# Patient Record
Sex: MALE | Race: WHITE
[De-identification: names, ages, dates, MRNs, and addresses within clinical notes are randomized per-mention and may not be internally consistent; named-entity substitution may affect disease eponyms.]

---

## 2017-05-22 ENCOUNTER — HOSPITAL ENCOUNTER (OUTPATIENT)
Dept: HOSPITAL 76 - DI | Age: 62
Discharge: HOME | End: 2017-05-22
Attending: NURSE PRACTITIONER
Payer: COMMERCIAL

## 2017-05-22 DIAGNOSIS — H93.19: Primary | ICD-10-CM

## 2017-05-22 PROCEDURE — 93880 EXTRACRANIAL BILAT STUDY: CPT

## 2017-05-24 NOTE — ULTRASOUND REPORT
BILATERAL CAROTID ARTERY ULTRASOUND: 05/22/2017 



CLINICAL HISTORY: Pulse in his ears. 



Examination was done with a [employer] color Doppler scanning system. 



TECHNIQUE: Real-time sonographic vascular imaging was performed by the 
sonographer through the carotid arteries utilizing both color-flow and Doppler 
spectral analysis. Multiple representative static images were saved for review. 









Vessel PSV

cm/sec 2D Plaque

Estimate % ICA/CCA

PSV EDV

cm/sec % Stenosis 

 

RCCA Prox 104   -- 

 

RCCA Dist 80   22 

 

RECA 190   -- 

 

RT BULB 78 -- 0.98 24 

 

CRISTINE Prox 71 -- 0.89 23 

 

CRISTINE Mid 76 -- 0.95 24 

 

CRISTINE Dist 90 -- 1.13 30  

 

RVA  47    





RVA flow direction: Antegrade.







Vessel PSV

cm/sec 2D Plaque

Estimate % ICA/CCA

PSV EDV

cm/sec % Stenosis

 

LCCA Prox 106   -- 

 

LCCA Dist 79   24 

 

LECA 168   -- 

 

LFT BULB 50 -- 0.63 17 

 

LICA Prox 82 -- 1.04 32 

 

LICA Mid 75 -- 0.95 35 

 

LICA Dist 46 -- 0.58 17  

 

LVA 79    





LVA flow direction: Antegrade.



Velocity criteria are extrapolated from diameter data as defined by the Society 
of Radiologists in Ultrasound Consensus Conference Radiology 2003; 229; 340-
346. 









Degree of

Stenosis % ICA PSV

cm/sec Plaque

Estimate % ICA/CCA RSV

Ratio ICA EDV

cm/sec

 

Normal < 125 None < 2.0 < 40

 

<50 < 125 < 50 < 2.0 < 40

 

50-69 125 - 130 >/= 50 2.0 - 4.0 40 - 100

 

>/= 70 but

less than near

occlusion > 230 >/= 50 > 4.0 > 100

 

Near occlusion High, low, or

undetectable Visible lumen Variable Variable

 

Total occlusion Undetectable No detectable

lumen Not

applicable Not

applicable





FINDINGS: Mild to moderate plaque is noted at each carotid artery bifurcation 
with no hemodynamically significant stenosis in either common or internal 
carotid artery. Doppler values are slightly elevated in each external carotid 
artery. Visually, however, no significant stenosis was detected in the external 
carotid arteries. Vertebral arteries bilaterally show antegrade flow. 



IMPRESSION: MILD TO MODERATE PLAQUE IS DETECTED AT EACH CAROTID ARTERY 
BIFURCATION WITH NO HEMODYNAMICALLY SIGNIFICANT STENOSIS DETECTED. 
MTDD

## 2017-08-04 ENCOUNTER — HOSPITAL ENCOUNTER (OUTPATIENT)
Dept: HOSPITAL 76 - DI.S | Age: 62
Discharge: HOME | End: 2017-08-04
Attending: FAMILY MEDICINE
Payer: COMMERCIAL

## 2017-08-04 DIAGNOSIS — M71.21: Primary | ICD-10-CM

## 2017-08-04 NOTE — XRAY REPORT
THREE-VIEW RIGHT KNEE:  08/04/2017

 

CLINICAL INDICATION:  Baker's cyst.  

 

FINDINGS:  AP, lateral, sunrise views of the right knee demonstrate no evidence of fracture or disloc
ation.  A tiny osteophyte is noted on the superior patella.  No radiopaque foreign body is seen in th
e soft tissues.  No joint effusion is present.  

 

IMPRESSION:  TINY MARGINAL OSTEOPHYTE ON THE SUPERIOR PATELLA.  

 

 

 

DD:08/04/2017 15:58:24  DT: 08/04/2017 16:24  JOB #: [id number]  EXT JOB #:[id number]

## 2017-11-14 ENCOUNTER — HOSPITAL ENCOUNTER (OUTPATIENT)
Dept: HOSPITAL 76 - DI.S | Age: 62
Discharge: HOME | End: 2017-11-14
Attending: PHYSICIAN ASSISTANT
Payer: COMMERCIAL

## 2017-11-14 DIAGNOSIS — M51.34: ICD-10-CM

## 2017-11-14 DIAGNOSIS — M25.511: Primary | ICD-10-CM

## 2017-11-14 DIAGNOSIS — M41.54: ICD-10-CM

## 2017-11-14 PROCEDURE — 72070 X-RAY EXAM THORAC SPINE 2VWS: CPT

## 2017-11-14 NOTE — XRAY REPORT
TWO VIEW THORACIC SPINE:  11/14/2017 

 

CLINICAL INDICATION:  Pain. 

 

Frontal and lateral views of the thoracic spine demonstrate mild degenerative disk disease.  Minimal 
dextroscoliosis is present.  No paraspinal hematoma is seen. 

 

IMPRESSION:  MILD DEGENERATIVE DISK DISEASE, WITH MINIMAL DEGENERATIVE DEXTROSCOLIOSIS.  NO EVIDENCE 
OF FRACTURE. 

 

 

 

DD:11/14/2017 15:02:03  DT: 11/14/2017 19:33  JOB #: [id number]  EXT JOB #:[id number]

## 2017-11-14 NOTE — XRAY REPORT
THREE VIEW RIGHT SHOULDER:  11/14/2017 

 

CLINICAL INDICATION:  Pain. 

 

Internal and external rotational views and a scapular Y-view of the right shoulder demonstrate no bryson
dence of fracture or dislocation.  The joint spaces are preserved.  No radiopaque foreign body is see
n in the soft tissues. 

 

IMPRESSION:  NORMAL RIGHT SHOULDER. 

 

 

 

DD:11/14/2017 15:04:15  DT: 11/14/2017 19:37  JOB #: [id number]  EXT JOB #:[id number]

## 2018-02-02 ENCOUNTER — HOSPITAL ENCOUNTER (INPATIENT)
Dept: HOSPITAL 76 - ED | Age: 63
LOS: 1 days | Discharge: HOME | DRG: 65 | End: 2018-02-03
Attending: INTERNAL MEDICINE | Admitting: INTERNAL MEDICINE
Payer: COMMERCIAL

## 2018-02-02 DIAGNOSIS — R29.701: ICD-10-CM

## 2018-02-02 DIAGNOSIS — Q28.1: ICD-10-CM

## 2018-02-02 DIAGNOSIS — Z86.73: ICD-10-CM

## 2018-02-02 DIAGNOSIS — R47.1: ICD-10-CM

## 2018-02-02 DIAGNOSIS — I11.9: ICD-10-CM

## 2018-02-02 DIAGNOSIS — I65.1: ICD-10-CM

## 2018-02-02 DIAGNOSIS — R29.810: ICD-10-CM

## 2018-02-02 DIAGNOSIS — I63.542: Primary | ICD-10-CM

## 2018-02-02 DIAGNOSIS — E78.9: ICD-10-CM

## 2018-02-02 DIAGNOSIS — R40.2412: ICD-10-CM

## 2018-02-02 DIAGNOSIS — Z91.14: ICD-10-CM

## 2018-02-02 LAB
ALBUMIN DIAFP-MCNC: 4.8 G/DL (ref 3.2–5.5)
ALBUMIN/GLOB SERPL: 1.6 {RATIO} (ref 1–2.2)
ALP SERPL-CCNC: 65 IU/L (ref 42–121)
ALT SERPL W P-5'-P-CCNC: 23 IU/L (ref 10–60)
ANION GAP SERPL CALCULATED.4IONS-SCNC: 14 MMOL/L (ref 6–13)
AST SERPL W P-5'-P-CCNC: 26 IU/L (ref 10–42)
BASOPHILS NFR BLD AUTO: 0.1 10^3/UL (ref 0–0.1)
BASOPHILS NFR BLD AUTO: 1.1 %
BILIRUB BLD-MCNC: 0.7 MG/DL (ref 0.2–1)
BUN SERPL-MCNC: 16 MG/DL (ref 6–20)
CALCIUM UR-MCNC: 9.9 MG/DL (ref 8.5–10.3)
CHLORIDE SERPL-SCNC: 97 MMOL/L (ref 101–111)
CO2 SERPL-SCNC: 28 MMOL/L (ref 21–32)
CREAT SERPLBLD-SCNC: 0.9 MG/DL (ref 0.6–1.2)
EOSINOPHIL # BLD AUTO: 0.1 10^3/UL (ref 0–0.7)
EOSINOPHIL NFR BLD AUTO: 1.5 %
ERYTHROCYTE [DISTWIDTH] IN BLOOD BY AUTOMATED COUNT: 13.5 % (ref 12–15)
GFRSERPLBLD MDRD-ARVRAT: 86 ML/MIN/{1.73_M2} (ref 89–?)
GLOBULIN SER-MCNC: 3 G/DL (ref 2.1–4.2)
GLUCOSE SERPL-MCNC: 101 MG/DL (ref 70–100)
HGB UR QL STRIP: 15.8 G/DL (ref 14–18)
INR PPP: 1 (ref 0.8–1.2)
LIPASE SERPL-CCNC: 39 U/L (ref 22–51)
LYMPHOCYTES # SPEC AUTO: 2.1 10^3/UL (ref 1.5–3.5)
LYMPHOCYTES NFR BLD AUTO: 27 %
MCH RBC QN AUTO: 29.2 PG (ref 27–31)
MCHC RBC AUTO-ENTMCNC: 33.1 G/DL (ref 32–36)
MCV RBC AUTO: 88.3 FL (ref 80–94)
MONOCYTES # BLD AUTO: 0.8 10^3/UL (ref 0–1)
MONOCYTES NFR BLD AUTO: 10.3 %
NEUTROPHILS # BLD AUTO: 4.6 10^3/UL (ref 1.5–6.6)
NEUTROPHILS # SNV AUTO: 7.7 X10^3/UL (ref 4.8–10.8)
NEUTROPHILS NFR BLD AUTO: 60.1 %
PDW BLD AUTO: 8.8 FL (ref 7.4–11.4)
PLATELET # BLD: 342 10^3/UL (ref 130–450)
PROT SPEC-MCNC: 7.8 G/DL (ref 6.7–8.2)
PROTHROM ACT/NOR PPP: 11.3 SECS (ref 9.9–12.6)
RBC MAR: 5.4 10^6/UL (ref 4.7–6.1)
SODIUM SERPLBLD-SCNC: 139 MMOL/L (ref 135–145)

## 2018-02-02 PROCEDURE — 93880 EXTRACRANIAL BILAT STUDY: CPT

## 2018-02-02 PROCEDURE — 80053 COMPREHEN METABOLIC PANEL: CPT

## 2018-02-02 PROCEDURE — 85610 PROTHROMBIN TIME: CPT

## 2018-02-02 PROCEDURE — 70551 MRI BRAIN STEM W/O DYE: CPT

## 2018-02-02 PROCEDURE — 85025 COMPLETE CBC W/AUTO DIFF WBC: CPT

## 2018-02-02 PROCEDURE — 83690 ASSAY OF LIPASE: CPT

## 2018-02-02 PROCEDURE — 93306 TTE W/DOPPLER COMPLETE: CPT

## 2018-02-02 PROCEDURE — 99285 EMERGENCY DEPT VISIT HI MDM: CPT

## 2018-02-02 PROCEDURE — 80048 BASIC METABOLIC PNL TOTAL CA: CPT

## 2018-02-02 PROCEDURE — 93005 ELECTROCARDIOGRAM TRACING: CPT

## 2018-02-02 PROCEDURE — 86147 CARDIOLIPIN ANTIBODY EA IG: CPT

## 2018-02-02 PROCEDURE — 84484 ASSAY OF TROPONIN QUANT: CPT

## 2018-02-02 PROCEDURE — 36415 COLL VENOUS BLD VENIPUNCTURE: CPT

## 2018-02-02 PROCEDURE — 99284 EMERGENCY DEPT VISIT MOD MDM: CPT

## 2018-02-02 PROCEDURE — 80061 LIPID PANEL: CPT

## 2018-02-02 PROCEDURE — 70450 CT HEAD/BRAIN W/O DYE: CPT

## 2018-02-02 PROCEDURE — 83721 ASSAY OF BLOOD LIPOPROTEIN: CPT

## 2018-02-02 PROCEDURE — 70544 MR ANGIOGRAPHY HEAD W/O DYE: CPT

## 2018-02-02 PROCEDURE — 85730 THROMBOPLASTIN TIME PARTIAL: CPT

## 2018-02-02 PROCEDURE — 70549 MR ANGIOGRAPH NECK W/O&W/DYE: CPT

## 2018-02-02 RX ADMIN — CARVEDILOL SCH MG: 3.12 TABLET, FILM COATED ORAL at 20:55

## 2018-02-02 RX ADMIN — SODIUM CHLORIDE, PRESERVATIVE FREE SCH ML: 5 INJECTION INTRAVENOUS at 15:30

## 2018-02-02 RX ADMIN — CARVEDILOL SCH MG: 3.12 TABLET, FILM COATED ORAL at 18:44

## 2018-02-02 RX ADMIN — SODIUM CHLORIDE, PRESERVATIVE FREE SCH ML: 5 INJECTION INTRAVENOUS at 20:59

## 2018-02-02 NOTE — MRI PRELIMINARY REPORT
Accession: [id number]

Exam: MRI BRAIN W/O

 

IMPRESSION: 

MRI HEAD:

1.Small  acute infarct (1-7 days) of the left putamen to left centrum semiovale. No evidence of hemor
rhagic transformation.

 

2. Additional mild white matter changes that may represent sequela of chronic small vessel ischemic d
isease.

 

3. Old hemorrhagic blood products are seen within the right putamen and right precuneus region. Etiol
ogies include microhemorrhages from hypertension or old hemorrhagic lacunar infarcts.

 

MRA HEAD:

1.There is greater than 90% stenosis of the proximal basilar artery. The remainder the basilar artery
 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

 

2. There is greater than 70% stenosis of a proximal M2 sylvian branch of the left MCA.

  

3. 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

 

4. 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 
BILL branches of the right BILL appear normal.

 

5. No definite intracranial aneurysm seen.

 

RADIA

 

The above findings  were discussed with Dr Overton by Dr. Herb Richardson at 16:30 hrs on 
2/2/18.

 

SITE ID: [id number]

## 2018-02-02 NOTE — ULTRASOUND PRELIMINARY REPORT
Accession: [id number]

Exam: US CAROTID DOPPLER COMPLETE

 

IMPRESSION: Bilateral predominantly soft carotid artery plaque with no hemodynamically significant st
enosis.

 

Validated velocity measurements with angiographic measurements and velocity criteria are extrapolated
 from diameter data as defined by the Society of Radiologists in Ultrasound Consensus Conference Radi
ology 2003; 229;340-346.

 

RADIA

 

SITE ID: [id number]

## 2018-02-02 NOTE — MRI REPORT
EXAMS:

MRI BRAIN WITHOUT CONTRAST. 

MRA BRAIN WITHOUT CONTRAST.

 

EXAM DATE: 2/2/2018 03:27 PM.

 

CLINICAL HISTORY: 8-year-old with 9 month history of pulsatile tinnitus presenting with 1-2 days of s
lurred speech

 

COMPARISON: CT head 2/2/2018.

 

TECHNIQUE: MRI: Multiplanar, multisequence T1-weighted and fluid-sensitive MRI sequences of the brain
 were performed. Sequences optimized for routine evaluation. Other: None. Post-processing: None. IV C
ontrast: None.

 

MRA: Multiplanar, multisequence T1-weighted and fluid-sensitive MRA sequences of the brain were perfo
rmed. Other: None.  Post-processing: Multiplanar 3D MIP reconstructions. IV Contrast: None.

 

FINDINGS:

MRI:

Brain Volume: Normal for age.

 

Parenchyma/Dura: No acute parenchymal hemorrhage, mass, or midline shift. There is mild bilateral are
as of T2/FLAIR signal hyperintensity seen. There is small volume restricted diffusion of the left put
amen extending to the left centrum semiovale. No evidence of hemorrhagic transformation. There is ass
ociated T2/FLAIR signal hyperintensity. Punctate foci of old hemorrhagic blood products are seen with
in the right putamen and right precuneus region. 

 

Ventricles/Cisterns: No hydrocephalus. No abnormal extra-axial fluid collection or hemorrhage.

 

Orbits: Symmetric and unremarkable.

 

Sella Turcica: The pituitary gland, cavernous sinuses, suprasellar cistern and optic chiasm are unrem
arkable.

 

IAC: Symmetric and unremarkable.

 

Vasculature: Normal signal flow void is seen in the major arterial structures at the skull base.

 

Sinuses: No acute appearing sinus disease.

 

Bones: No focal pathologic appearing marrow signal changes.

 

Other: None.

 

MRA:

RIGHT

Internal Carotid (ICA): No aneurysm, stenosis or anomaly.

Middle Cerebral (MCA): No aneurysm, stenosis or anomaly.

Anterior Cerebral (BILL): Hypoplastic right A1 segment with normal-appearing A2 and distal BILL branche
s secondary to flow through the anterior communicating artery. No aneurysm.

Posterior Cerebral (PCA): Fetal-type PCA with a hypoplastic right P1 segment. No aneurysm.

Posterior Communicating (P-COM): No aneurysm, stenosis or anomaly.

 

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

 

LEFT

Internal Carotid (ICA): No aneurysm, stenosis or anomaly.

Middle Cerebral (MCA): The M1 segment appears normal. There is greater than 70% stenosis of a proxima
l M2 sylvian branch.

Anterior Cerebral (BILL): No aneurysm, stenosis or anomaly.

Posterior Cerebral (PCA): Fetal PCA. No aneurysm.

Posterior Communicating (P-COM): No aneurysm, stenosis or anomaly.

 

Vertebral: No aneurysm, stenosis or anomaly in the visualized upper vertebral artery.

 

MIDLINE

Anterior Communicating (A-COM): No aneurysm, stenosis or anomaly.

Basilar artery: There is greater than 90% stenosis of the proximal basilar artery. The remainder the 
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

 

Other: None.

 

IMPRESSION: 

MRI HEAD:

1.Small  acute infarct (1-7 days) of the left putamen to left centrum semiovale. No evidence of hemor
rhagic transformation.

 

2. Additional mild white matter changes that may represent sequela of chronic small vessel ischemic d
isease.

 

3. Old hemorrhagic blood products are seen within the right putamen and right precuneus region. Etiol
ogies include microhemorrhages from hypertension or old hemorrhagic lacunar infarcts.

 

MRA HEAD:

1.There is greater than 90% stenosis of the proximal basilar artery. The remainder the basilar artery
 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

 

2. There is greater than 70% stenosis of a proximal M2 sylvian branch of the left MCA.

  

3. 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

 

4. 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 
BILL branches of the right BILL appear normal.

 

5. No definite intracranial aneurysm seen.

 

RADIA

 

The above findings  were discussed with Dr Overton by Dr. Herb Richardson at 16:30 hrs on 
2/2/18.

Referring Provider Line: [phone]

 

SITE ID: [id number]

## 2018-02-02 NOTE — MRI PRELIMINARY REPORT
Accession: [id number]

Exam: MRI ANGIO NECK W/WO (MRA)

 

IMPRESSION: 

1. Hypoplastic right vertebral artery appears terminates as right PICA.

 

2. Otherwise no seen definite stenosis or dissection seen within the vasculature of the neck.

 

3. Please refer to separately dictated MRA head 2/2/2018 for description of intracranial findings.

 

RADIA

 

The above findings  were discussed with Dr Overton by Dr. Herb Richardson at 16:30 hrs on 
2/2/18.

 

SITE ID: [id number]

## 2018-02-02 NOTE — ULTRASOUND REPORT
EXAM:

CAROTID DOPPLER ULTRASOUND

 

EXAM DATE: 2/2/2018 05:19 PM.

 

CLINICAL HISTORY: CVA.

 

COMPARISON: None.

 

TECHNIQUE: Real-time sonographic vascular imaging was performed by the sonographer through the [employer]
d arterial system with a linear transducer utilizing color-flow, Doppler flow and spectral analysis. 
Multiple representative static images were saved for review.

 

FINDINGS: Gray scale evaluation of the carotid arteries demonstrates mild, predominantly soft plaque 
in the right common and proximal internal carotid artery. Mild to moderate soft plaque also seen in t
he distal left common and internal carotid artery. Peak systolic velocities and ICA/CCA ratios are wi
thin normal limits as follows

 

Right:

RCCA Prox: PSV 84 cm/sec.

RCCA Dist: PSV 82 cm/sec, EDV 17 cm/sec.

RECA:  cm/sec.

RT BULB: PSV 67 cm/sec, EDV 17 cm/sec, ICA/CCA ratio 0.81.

CRISTINE Prox: PSV 63 cm/sec, EDV 26 cm/sec, ICA/CCA ratio 0.76.

CRISTINE Mid: PSV 76 cm/sec, EDV 20 cm/sec, ICA/CCA ratio 0.92.

CRISTINE Dist: PSV 55 cm/sec, EDV 16 cm/sec, ICA/CCA ratio 0.67, tortuous.

RVA: PSV 37 cm/sec. RVA flow direction: Antegrade.

Abnormal waveform but antegrade.

 

Left:

LCCA Prox: PSV 83 cm/sec.

LCCA Dist: PSV 67 cm/sec, EDV 20 cm/sec.

LECA:  cm/sec.

LT BULB: PSV 85 cm/sec, EDV 18 cm/sec, ICA/CCA ratio 1.26.

LICA Prox: PSV 80 cm/sec, EDV 30 cm/sec, ICA/CCA ratio 1.19.

LICA Mid: PSV 94 cm/sec, EDV 41 cm/sec, ICA/CCA ratio 1.40.

LICA Distal: PSV 64 cm/sec, EDV 24 cm/sec, ICA/CCA ratio 0.95.

LVA: PSV 47 cm/sec. LVA flow direction: Antegrade.

 

Other: None.

 

IMPRESSION: Bilateral predominantly soft carotid artery plaque with no hemodynamically significant st
enosis.

 

Validated velocity measurements with angiographic measurements and velocity criteria are extrapolated
 from diameter data as defined by the Society of Radiologists in Ultrasound Consensus Conference Radi
ology 2003; 229;340-346.

 

RADIA

Referring Provider Line: [phone]

 

SITE ID: [id number]

## 2018-02-02 NOTE — ED PHYSICIAN DOCUMENTATION
PD HPI FOCAL NEURO





- Stated complaint


Stated Complaint: SPEECH DIFFICULTIES





- Chief complaint


Chief Complaint: Neuro





- History obtained from


History obtained from: Patient, Family (wife)





- History of Present Illness


Timing - onset: Yesterday


Timing - details: Still present


Severity of deficit: Moderate


Associated symptoms: Other (slurred speech; difficulty finding words.)


Baseline status: positive: A&OX3, ambulatory, indep


Similar symptoms before: Has not had sx before





- Additional information


Additional information: 





The patient is a 62-year-old male who presents with slurred speech and 

difficulty finding words.  His symptoms started yesterday (according to his wife

's observation) or the day before (according to the patient) and persists 

today.  He denies numbness or weakness.  He denies headache, visual disturbance

, chest pain, nausea, or vomiting.  He denies history of similar symptoms in 

the past.


Past medical history is significant for pulsatile tinnitus.  He underwent 

carotid duplex scan in August 2017, which he states revealed no significant 

stenosis.





Review of Systems


Constitutional: denies: Fever, Fatigue


Eyes: denies: Decreased vision


Ears: reports: Tinnitus/ringing (History of tinnitus, but none currently.)


Nose: denies: Congestion


Throat: denies: Sore throat


Cardiac: denies: Chest pain / pressure


Respiratory: denies: Dyspnea, Cough


GI: denies: Abdominal Pain, Nausea, Vomiting


: denies: Dysuria


Skin: denies: Rash


Musculoskeletal: denies: Back pain, Extremity pain


Neurologic: reports: Difficulty speaking.  denies: Focal weakness, Numbness, 

Altered mental status, Headache





PD PAST MEDICAL HISTORY





- Past Medical History


Cardiovascular: None


Respiratory: None


Neuro: Other (Pulsatile tinnitus)


Endocrine/Autoimmune: None





- Past Surgical History


Ortho: Other (Knee surgery)





- Present Medications


Home Medications: 


 Ambulatory Orders











 Medication  Instructions  Recorded  Confirmed


 


No Known Home Medications [No  02/02/18 02/02/18





Known Home Medications]   














- Allergies


Allergies/Adverse Reactions: 


 Allergies











Allergy/AdvReac Type Severity Reaction Status Date / Time


 


No Known Drug Allergies Allergy   Verified 02/02/18 09:11














- Living Situation


Living Situation: reports: With spouse/s.o.


Living Arrangement: reports: At home





- Social History


Does the pt smoke?: No


Does the pt drink ETOH?: Yes





- Immunizations


Immunizations are current?: Yes





PD ED PE NORMAL





- Vitals


Vital signs reviewed: Yes (Hypertensive)





- General


General: Alert and oriented X 3, Well developed/nourished





- HEENT


HEENT: Atraumatic, PERRL, EOMI, Moist mucous membranes, Pharynx benign





- Neck


Neck: Supple, no meningeal sign, No adenopathy, No JVD





- Cardiac


Cardiac: RRR, No murmur





- Respiratory


Respiratory: No respiratory distress, Clear bilaterally





- Abdomen


Abdomen: Soft, Non tender





- Back


Back: No CVA TTP





- Derm


Derm: No rash





- Extremities


Extremities: No edema, No calf tenderness / cord





- Neuro


Neuro: Alert and oriented X 3, CNs 2-12 intact, No motor deficit, No sensory 

deficit, Other (Struggles to articulate the words he wants to speak.  

Enunciates words with difficulty.)


Eye Opening: Spontaneous


Motor: Obeys Commands


Verbal: Oriented


GCS Score: 15





NIHSS





- Time


Time: 09:05





- Level of Consciousness


Level of consciousness: (0) Alert, Keenly responsive


LOC Questions: (0) Answers both Q's correct


LOC Commands: (0) Performs both correctly





- Gaze


Best Gaze: (0) Normal





- Visual


Visual: (0) No loss





- Facial Palsy


Facial Palsy: (0) Normal, symmetrical movement





- Motor Arms (both separate)


Motor Arm (right): (0) No drift


Motor Arm (left): (0) No drift





- Motor Legs (both separate)


Motor Leg (right): (0) No drift


Motor Leg (left): (0) No drift





- Limb Ataxia


Limb Ataxia: (0) Absent





- Sensory


Sensory: (0) Normal





- Best Language


Best Language: (1) mild-to-aphasia





- Dysarthria


Dysarthria: (0) Normal





- Extinction and Inattention (formally neg


Extinction and inattention: (0) No abnormality





- Total Score/Results


Total Score/Result: 1





Results





- Vitals


Vitals: 


 Vital Signs - 24 hr











  02/02/18 02/02/18 02/02/18





  09:01 09:30 10:35


 


Temperature 36.5 C  


 


Heart Rate 74 72 71


 


Respiratory 25 H 20 14





Rate   


 


Blood Pressure 170/100 H 170/100 H 177/88 H


 


O2 Saturation 25 L 96 96














  02/02/18





  11:57


 


Temperature 


 


Heart Rate 78


 


Respiratory 20





Rate 


 


Blood Pressure 183/77 H


 


O2 Saturation 96








 Oxygen











O2 Source                      Room air

















- EKG (time done)


  ** 09:15


Rate: Rate (enter#) (72)


Rhythm: NSR, LAE


Axis: Normal


Intervals: Normal TX


QRS: LVH


Ischemia: ST elevation c/w repol


Compare to prior EKG: Old EKG unavailable


Computer interpretation: Agree with computer





- Labs


Labs: 


 Laboratory Tests











  02/02/18 02/02/18 02/02/18





  09:10 09:10 09:10


 


WBC  7.7  


 


RBC  5.40  


 


Hgb  15.8  


 


Hct  47.7  


 


MCV  88.3  


 


MCH  29.2  


 


MCHC  33.1  


 


RDW  13.5  


 


Plt Count  342  


 


MPV  8.8  


 


Neut #  4.6  


 


Lymph #  2.1  


 


Mono #  0.8  


 


Eos #  0.1  


 


Baso #  0.1  


 


Absolute Nucleated RBC  0.01  


 


Nucleated RBC %  0.1  


 


PT   11.3 


 


INR   1.0 


 


APTT   32.1 


 


Sodium    139


 


Potassium    3.7


 


Chloride    97 L


 


Carbon Dioxide    28


 


Anion Gap    14.0 H


 


BUN    16


 


Creatinine    0.9


 


Estimated GFR (MDRD)    86 L


 


Glucose    101 H


 


Calcium    9.9


 


Total Bilirubin    0.7


 


AST    26


 


ALT    23


 


Alkaline Phosphatase    65


 


Total Protein    7.8


 


Albumin    4.8


 


Globulin    3.0


 


Albumin/Globulin Ratio    1.6


 


Lipase    39














- Rads (name of study)


  ** Head CT stroke protocol


Radiology: Prelim report reviewed, EMP read contemporaneously, See rad report (

Possible left hyperdense MCA sign, with possible areas of early left MCA 

territory edema.  Low threshold for MRI.)





PD MEDICAL DECISION MAKING





- ED course


Complexity details: reviewed results, re-evaluated patient, considered 

differential, d/w patient, d/w family, d/w consultant


ED course: 


The patient's presentation is most consistent with CVA involving the left 

middle cerebral artery distribution.  CT scan stroke protocol reveals no 

intracranial hemorrhage, but does reveal possible left hyperdense MCA sign with 

possible areas of early left MCA territory edema.  I discussed his condition 

with neurologist at [facility].  He recommends further stroke workup at 

this hospital, and advises no benefit to transferring to an acute care facility.


Treatment in the emergency department included administration of 4 baby aspirin 

orally.  I discussed his presentation with Dr. HAMEED, the hospitalist, who will 

admit him for further evaluation and treatment.





Departure





- Departure


Disposition: [facility] DC/Xfer


Clinical Impression: 


Cerebrovascular accident (CVA)


Qualifiers:


 Precerebral and cerebral artery: middle cerebral artery Laterality of affected 

vessel: left 





Condition: Stable


Discharge Date/Time: 02/02/18 12:55

## 2018-02-02 NOTE — MRI REPORT
EXAM:

MR ANGIOGRAM NECK

 

EXAM DATE: 2/2/2018 03:27 PM.

 

CLINICAL HISTORY: 62-year-old with 1-2 history of slurred speech

 

COMPARISON: None.

 

TECHNIQUE: Multiplanar, multisequence MRA sequences of the neck were performed. Other: None. Post-pro
cessing: Multiplanar 3D MIP reconstructions. IV Contrast: 7 cc Gadavist.  Evaluation of arterial sten
osis is based on a NASCET method of measurement.

 

FINDINGS: 

RIGHT

Common Carotid: Patent. No dissection or significant stenosis.

Internal Carotid: Patent. No dissection or significant stenosis.

External Carotid: Patent. No dissection or significant stenosis.

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

 

LEFT

Common Carotid: Patent. No dissection or significant stenosis.

Internal Carotid: Patent. No dissection or significant stenosis.

External Carotid: Patent. No dissection or significant stenosis.

Vertebral: Patent. No dissection or significant stenosis.

 

Intracranial Circulation: Please refer to separately dictated MRA head for further description of int
racranial findings.

 

Other: The soft tissues, bones, and lung apices are unremarkable.

 

IMPRESSION: 

1. Hypoplastic right vertebral artery appears terminates as right PICA.

 

2. Otherwise no seen definite stenosis or dissection seen within the vasculature of the neck.

 

3. Please refer to separately dictated MRA head 2/2/2018 for description of intracranial findings.

 

RADIA

 

The above findings  were discussed with Dr Overton by Dr. Herb Richardson at 16:30 hrs on 
2/2/18.

Referring Provider Line: [phone]

 

SITE ID: [id number]

## 2018-02-02 NOTE — MRI REPORT
EXAMS:

MRI BRAIN WITHOUT CONTRAST. 

MRA BRAIN WITHOUT CONTRAST.

 

EXAM DATE: 2/2/2018 03:27 PM.

 

CLINICAL HISTORY: 8-year-old with 9 month history of pulsatile tinnitus presenting with 1-2 days of s
lurred speech

 

COMPARISON: CT head 2/2/2018.

 

TECHNIQUE: MRI: Multiplanar, multisequence T1-weighted and fluid-sensitive MRI sequences of the brain
 were performed. Sequences optimized for routine evaluation. Other: None. Post-processing: None. IV C
ontrast: None.

 

MRA: Multiplanar, multisequence T1-weighted and fluid-sensitive MRA sequences of the brain were perfo
rmed. Other: None.  Post-processing: Multiplanar 3D MIP reconstructions. IV Contrast: None.

 

FINDINGS:

MRI:

Brain Volume: Normal for age.

 

Parenchyma/Dura: No acute parenchymal hemorrhage, mass, or midline shift. There is mild bilateral are
as of T2/FLAIR signal hyperintensity seen. There is small volume restricted diffusion of the left put
amen extending to the left centrum semiovale. No evidence of hemorrhagic transformation. There is ass
ociated T2/FLAIR signal hyperintensity. Punctate foci of old hemorrhagic blood products are seen with
in the right putamen and right precuneus region. 

 

Ventricles/Cisterns: No hydrocephalus. No abnormal extra-axial fluid collection or hemorrhage.

 

Orbits: Symmetric and unremarkable.

 

Sella Turcica: The pituitary gland, cavernous sinuses, suprasellar cistern and optic chiasm are unrem
arkable.

 

IAC: Symmetric and unremarkable.

 

Vasculature: Normal signal flow void is seen in the major arterial structures at the skull base.

 

Sinuses: No acute appearing sinus disease.

 

Bones: No focal pathologic appearing marrow signal changes.

 

Other: None.

 

MRA:

RIGHT

Internal Carotid (ICA): No aneurysm, stenosis or anomaly.

Middle Cerebral (MCA): No aneurysm, stenosis or anomaly.

Anterior Cerebral (BILL): Hypoplastic right A1 segment with normal-appearing A2 and distal BILL branche
s secondary to flow through the anterior communicating artery. No aneurysm.

Posterior Cerebral (PCA): Fetal-type PCA with a hypoplastic right P1 segment. No aneurysm.

Posterior Communicating (P-COM): No aneurysm, stenosis or anomaly.

 

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

 

LEFT

Internal Carotid (ICA): No aneurysm, stenosis or anomaly.

Middle Cerebral (MCA): The M1 segment appears normal. There is greater than 70% stenosis of a proxima
l M2 sylvian branch.

Anterior Cerebral (BILL): No aneurysm, stenosis or anomaly.

Posterior Cerebral (PCA): Fetal PCA. No aneurysm.

Posterior Communicating (P-COM): No aneurysm, stenosis or anomaly.

 

Vertebral: No aneurysm, stenosis or anomaly in the visualized upper vertebral artery.

 

MIDLINE

Anterior Communicating (A-COM): No aneurysm, stenosis or anomaly.

Basilar artery: There is greater than 90% stenosis of the proximal basilar artery. The remainder the 
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

 

Other: None.

 

IMPRESSION: 

MRI HEAD:

1.Small  acute infarct (1-7 days) of the left putamen to left centrum semiovale. No evidence of hemor
rhagic transformation.

 

2. Additional mild white matter changes that may represent sequela of chronic small vessel ischemic d
isease.

 

3. Old hemorrhagic blood products are seen within the right putamen and right precuneus region. Etiol
ogies include microhemorrhages from hypertension or old hemorrhagic lacunar infarcts.

 

MRA HEAD:

1.There is greater than 90% stenosis of the proximal basilar artery. The remainder the basilar artery
 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

 

2. There is greater than 70% stenosis of a proximal M2 sylvian branch of the left MCA.

  

3. 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

 

4. 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 
BILL branches of the right BILL appear normal.

 

5. No definite intracranial aneurysm seen.

 

[facility]

 

The above findings  were discussed with Dr Overton by Dr. Herb Richardson at 16:30 hrs on 
2/2/18.

Referring Provider Line: [phone]

 

SITE ID: [id number]

## 2018-02-02 NOTE — MRI PRELIMINARY REPORT
Accession: [id number]

Exam: MRI ANGIO BRAIN W/O (MRA)

 

IMPRESSION: 

MRI HEAD:

1.Small  acute infarct (1-7 days) of the left putamen to left centrum semiovale. No evidence of hemor
rhagic transformation.

 

2. Additional mild white matter changes that may represent sequela of chronic small vessel ischemic d
isease.

 

3. Old hemorrhagic blood products are seen within the right putamen and right precuneus region. Etiol
ogies include microhemorrhages from hypertension or old hemorrhagic lacunar infarcts.

 

MRA HEAD:

1.There is greater than 90% stenosis of the proximal basilar artery. The remainder the basilar artery
 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

 

2. There is greater than 70% stenosis of a proximal M2 sylvian branch of the left MCA.

  

3. 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

 

4. 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 
BILL branches of the right BILL appear normal.

 

5. No definite intracranial aneurysm seen.

 

RAD[facility]

 

The above findings  were discussed with Dr Overton by Dr. Herb Richardson at 16:30 hrs on 
2/2/18.

 

SITE ID: [id number]

## 2018-02-02 NOTE — CT REPORT
EXAM:

CT HEAD

 

EXAM DATE: 2/2/2018 09:28 AM.

 

CLINICAL HISTORY: Slurred speech.

 

COMPARISON: None.

 

TECHNIQUE: Multiaxial CT images were obtained from the foramen magnum to the vertex. Reformats: Coron
al. IV contrast: None.

 

In accordance with CT protocol optimization, one or more of the following dose reduction techniques w
ere utilized for this exam: automated exposure control, adjustment of mA and/or KV based on patient s
ize, or use of iterative reconstructive technique.

 

FINDINGS:

Parenchyma: There is a prominent appearance of the left MCA. It measures 45 Hounsfield units, borderl
ine increased and concerning for a possible hyperdense MCA sign.

 

In addition, there are subtle areas of low-attenuation in the MCA territory deep white matter that co
uld represent early edema. There is a 7 mm low attenuating focus adjacent to the left frontal horn de
ep white matter, likely a small lacunar infarct of uncertain chronicity.

 

Extraaxial Spaces: Normal for age. No subdural or epidural collections identified.

 

Ventricles: Normal in size and position.

 

Sinuses and Orbits: Imaged paranasal sinuses, orbits, and mastoids show no significant abnormality.

 

Bones: No evidence of fracture or calvarial defect.

 

IMPRESSION: Possible left hyperdense MCA sign, with possible areas of early left MCA territory edema.
 Low threshold for MRI.

 

Note: These critical results were discussed with Dr. Silverio on the day of the exam 02/02/2018 at 9:42 
AM, and he expressed understanding.

 

[facility]

Referring Provider Line: [phone]

 

SITE ID: [id number]

## 2018-02-03 VITALS — DIASTOLIC BLOOD PRESSURE: 86 MMHG | SYSTOLIC BLOOD PRESSURE: 163 MMHG

## 2018-02-03 LAB
ANION GAP SERPL CALCULATED.4IONS-SCNC: 13 MMOL/L (ref 6–13)
BUN SERPL-MCNC: 15 MG/DL (ref 6–20)
CALCIUM UR-MCNC: 9.2 MG/DL (ref 8.5–10.3)
CHLORIDE SERPL-SCNC: 98 MMOL/L (ref 101–111)
CHOLEST SERPL-MCNC: 232 MG/DL
CO2 SERPL-SCNC: 28 MMOL/L (ref 21–32)
CREAT SERPLBLD-SCNC: 0.9 MG/DL (ref 0.6–1.2)
GFRSERPLBLD MDRD-ARVRAT: 86 ML/MIN/{1.73_M2} (ref 89–?)
GLUCOSE SERPL-MCNC: 114 MG/DL (ref 70–100)
HDLC SERPL-MCNC: 58 MG/DL
HDLC SERPL: 4 {RATIO} (ref ?–5)
LDLC SERPL CALC-MCNC: 153 MG/DL
LDLC/HDLC SERPL: 2.6 {RATIO} (ref ?–3.6)
SODIUM SERPLBLD-SCNC: 139 MMOL/L (ref 135–145)
VLDLC SERPL-SCNC: 21 MG/DL

## 2018-02-03 RX ADMIN — SODIUM CHLORIDE, PRESERVATIVE FREE SCH ML: 5 INJECTION INTRAVENOUS at 05:45

## 2018-02-03 RX ADMIN — CARVEDILOL SCH MG: 3.12 TABLET, FILM COATED ORAL at 08:01

## 2018-02-03 NOTE — HISTORY & PHYSICAL EXAMINATION
DATE OF SERVICE: 02/02/2018

Physician: April Overton MD

 

DATE OF ADMISSION:  02/02/2018.

 

HISTORY OF PRESENT ILLNESS:  This is a 62-year-old white male who takes no 
prescription 

medications.  He remembers being told that he had hypertension and was advised 
to start 

antihypertensives but never did because he "does not believe in taking 
medications."  (He told 

me that he thinks that blood pressure medications "do more harm than good.")

 

The patient reports that he has had increased stress over the past 1 month 
related to his work.  

He states that about 2-1/2 days ago, he developed a foggy feeling and inability 
to find the 

right words, requiring forced slow speech to say his feelings clearly.  He was 
able to paddle 

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  He denied any headache, double 
vision, 

weakness in any extremities.  There was recent diarrhea about a week previously 
that lasted for

approximately 4 days, and he thought this was from eating bad food.  There have 
been no changes

in medications or recent trips.  He did tell his wife about these feelings. She 
urged him to come to 

the emergency room, and he finally did, approximately 2-1/2 days after the 
onset of symptoms.  

In the emergency room, his brain imaging does show evidence of a subacute stroke
, and 

therefore, he is admitted for management.

 

PAST MEDICAL HISTORY:  Probable hypertension, according to the patient, and he 
denies any other 

past medical history.

 

MEDICATIONS AT HOME:  None. 

The patient then states that he does take a baby aspirin daily but "did not 
consider this a daily medication"

 

ALLERGIES:  NONE.

 

REVIEW OF SYSTEMS:  The patient has suffered episodic tinnitus and has not yet 
sought medical 

attention for this.  He had recent diarrhea as above, now cleared. Other than 
these symptoms, 

a comprehensive review of systems was performed and is negative except for 
above.

 

FAMILY HISTORY:  On his father's side, there is heart disease, and his uncle on 
the mother's 

side had heart disease.

 

SOCIAL HISTORY:  The patient is a nonsmoker who never smoked, drinks social 
alcohol, has never

used illicit drugs; however, he has recently used some topical marijuana 
application.

 

PHYSICAL EXAMINATION

GENERAL:  A white male who is in no distress but does appear anxious.

VITAL SIGNS:  Blood pressure 170/100 and as high as 183/77 in the emergency 
room.  Pulse is 

70-80, in sinus rhythm.  He is afebrile.  Respiratory rate was 25 on 
presentation, which was 

felt to be from being anxious.  Currently, his respiratory rate is 18.

HEENT:  Possible asymmetry to his lower face with a droop on the right side, 
otherwise normal 

wexcept he is wearing glasses.  His oral mucosa is moist. Retinal exam showed 
no areas of retinal 

hemorrhage or AV nicking with normal-appearing retinal vessels.

NECK:  No JVD.  No carotid bruits are heard.  No thyromegaly or lymphadenopathy.

LUNGS:  Clear.

HEART:  Heart sounds normal.  No audible murmur.

ABDOMEN:  Soft, normal bowel sounds, nontender.  No organomegaly or ascites.

EXTREMITIES:  No clubbing, cyanosis or edema.

NEUROLOGIC:  Motor strength is equal bilaterally in the upper and lower 
extremities.  Sensory 

exam is grossly normal bilaterally. Balance tests are normal.  Possible facial 
muscle asymmetry.

He does have some slowing of speech and word finding difficulty.

 

LABORATORY DATA:  Sodium 139, potassium 3.7, BUN 16, creatinine 0.9.  Normal 
liver tests.  

Troponin not detectable.  CBC normal.  INR normal.

 

IMAGING:

Chest x-ray was not done. 

His head CT showed: possible left hyperdense MCA sign with areas of early left 
MCA territory 

edema, and an MRI of the brain was recommended.

 

EKG:  Normal sinus rhythm, LVH voltage with strain pattern.  No old EKG is 
available for 

comparison.

 

IMPRESSION

1.  Subacute cerebrovascular accident, documented by imaging. Symptoms are 
dysarthria and possibly facial 

droop.

2.  Hypertension, which is poorly controlled and probably longstanding since 
there is evidence of left ventricular 

hypertrophy by electrocardiogram.

3.  Tinnitus that is intermittent, never had workup.

4.  Noncompliance with antihypertensive medication recommendations.

 

PLAN:  Admit the patient and place him on telemetry to rule out arrhythmia, 
specifically 

fibrillation as the cause for the stroke.  Obtain imaging studies to rule out a 
source of 

embolus, including echo for clot and for intracardiac shunt and to evaluate for 
LVH, which is 

seen on EKG.  Order troponins x3, since the EKG is abnormal, to rule out an MI.
  Obtain brain 

imaging, including brain MRI and MRA and neck MRA, also confirm with carotid 
Doppler.  Start 

the patient on aspirin. Check lipids and treat per guidelines. The emergency 
room doctor has already 

spoken to the neurologist from [facility], our higher level contact center
, who recommended 

that he does not need to be transferred to a higher level of care for these 
tests but that they can 

be performed here. This was discussed with the patient, including the overall 
plan.  He initially was not 

amenable to starting blood pressure medications but eventually did agree.  

 

CODE STATUS:  FULL CODE.

 

Deep venous thrombosis prophylaxis:  SCDs.  No Lovenox or heparin will be used 
because of the 

possibility of hemorrhagic findings after a stroke, on brain imaging. 

 

ATTESTATION:  The patient is expected to be discharged or transferred to 
another facility 

within 96 hours:  Yes.

 

 

DD: 02/03/2018 19:12

TD: 02/03/2018 20:05

Job #: [id number]

TRACEY

## 2018-02-03 NOTE — DISCHARGE PLAN
Discharge Plan


Disposition: 01 Home, Self Care


Condition: Stable


Prescriptions: 


Atorvastatin Calcium [Lipitor] 80 mg PO DAILY #30 tablet


Carvedilol [Coreg] 3.125 mg PO BID #60 tablet


Clopidogrel [Plavix] 75 mg PO DAILY #30 tablet


Diet: Cardiac


Activity Restrictions: No strenuous activity til seen by MD


Shower Restrictions: No


Driving Restrictions: No


Instruction Topics:  Atorvastatin tablets, Clopidogrel Bisulfate Oral tablet, 

Carvedilol tablets, Hypertension Dc, Heart Risk, ED Stroke Completed, ED 

Cholesterol High


Additional Instructions or Follow Up instructions: 


Start taking :


   1 baby Aspirin daily


   Plavix daily


   Lipitor daily (at night)


   Coreg twice a day (with meals)


Make an appointment at [facility] to be seen soon.


Make an appointment at a Speech Therapy clinic.


If you have any new neurological symptoms, go to your closest ER quickly.


No strenuous exercise and decrease your workload at your place of employment 

for approximately the next 4 weeks (or til OKd to resume it, by your PCP or 

Cardiologist).


See your PCP in 1-2 weeks in follow-up and to be referred to a Cardiologist for 

stress testing and BP and cholesterol management.


No Smoking: If you smoke, Please STOP!  Call [phone] for help.


Follow-up with: 


Shreya Lorenzo PA [Primary Care Provider] -

## 2018-02-14 NOTE — DISCHARGE SUMMARY
Physician: April Overton MD



DATE OF ADMISSION:  02/02/2018

 

DATE OF DISCHARGE:  02/03/2018

 

HISTORY OF PRESENT ILLNESS:  This is a 62-year-old white male who takes no 
prescription 

medications; however, was told that he has hypertension and advised to start 
antihypertensives,

but never did because he "does not believe in taking medications and thinks 
they do more harm 

than good." The patient presented with a 2-1/2 day history of "foggy feeling", 
inability to 

find words and forced slow speech.  He came to the emergency room at the urging 
of his wife and

was placed in observation for evaluation of a TIA versus CVA.

 

HOSPITAL COURSE AND DISCHARGE DIAGNOSES

1.  Cerebrovascular accident.  The patient's symptoms were minimally improved 
during his stay. 

His imaging studies showed the following:  Head CT:  Possible left hyperdense 
MCA sign with 

possible area of early left MCA territory edema consistent with a stroke.  
Carotid Doppler:  

Bilateral soft carotid artery plaque with no significant stenoses.  Brain MRI:  
Small acute 

infarct of 1-7 days and age of the left putamen to the left centrum semiovale 
with no 

hemorrhagic transformation.  Additional mild white matter changes that may 
represent sequelae 

of chronic small vessel ischemic disease.  Old hemorrhagic blood products in 
the right putamen 

and right precuneus region, which may be from micro hemorrhages from 
hypertension or old 

hemorrhagic lacunar infarct.  MRA of the head:  Greater than 90% stenosis of 
the proximal 

basilar artery.  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 
congenital and there

are bilateral fetal PCAs.  Greater than 70 percent stenosis of a proximal M2, 
Sylvian branch of

the left MCA.  Hypoplastic right vertebral artery that appears to terminate in 
the right PICA. 

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  No 
intracranial 

aneurysm.  Neck MRA:  Hypoplastic right vertebral artery, which terminates at 
the right PICA 

and no definitive stenoses or dissection in the vasculature of the neck.  
Echocardiogram: there was

moderate to severe concentric LVH with EF mildly impaired at 45% with global 
hypokinesis and 

grade 1 diastolic dysfunction.  RV normal in size and function, mild mitral 
regurgitation, 

trace tricuspid regurgitation with calculated PA pressure 23 mmHg.  Trace 
pulmonic 

regurgitation.  No intracardiac shunt by saline study.  EKG showed normal sinus 
rhythm with 

marked LVH voltage.  There was no atrial fibrillation seen on telemetry.  The 
patient was 

started on daily oral aspirin and Plavix.  His cholesterol was checked and 
showed a total cholesterol of 

232, , triglycerides 105, HDL 58.  He was also put on atorvastatin 80 mg 
p.o. at 

bedtime.  I had a long discussion with him regarding compliance and management 
of his stroke 

risk factors.  The patient was initially resistant, but eventually agreed to 
take prescription 

medications.

2.  Old lacunar infarcts versus hemorrhages from hypertension.  Similar 
management as in #1.  I

stressed the importance of having good blood pressure control, taking 
antiplatelet agents, and 

cholesterol control.  Regarding the stroke and findings of abnormal 
atherosclerotic and 

congenital vascular findings, I reached out to the [facility] Neurology doctors on-
call for this hospital,

and reviewed all these findings, and medical management was advised, and no 
transfer was necessary 

because the symptoms were over 24 hours from starting.  The patient was advised 
to be seen by a 

Neurologist at the referral of his PCP or to go to [facility].

3.  Hypertension.  There is evidence of end organ damage showing moderate 
concentric LVH on the

echo and the global LV hypokinesis may also be related to this.  The patient's 
creatinine was 

normal at 0.9 with an estimated GFR of 86.  No urinalysis was performed.  The 
patient was 

started on carvedilol 3.125 p.o. b.i.d. for treatment of both hypertension and 
his 

cardiomyopathy.  Initially permissive hypertension was allowed, as advised by 
the neurology 

specialist. Further adjustments in blood pressure med dosage and other 
medications should be 

done as an outpatient.

4.  Left ventricular hypertrophy.  This was seen on both Echo and by EKG 
criteria.  This was 

again explained to the patient with his wife present at bedside regarding the 
suspicion of 

longstanding poorly controlled hypertension as the cause.  Medication 
compliance for 

hypertension control and cardiac treatment was strongly advised. The patient's 
and wife's questions 

were answered to their satisfaction.

5.  Cardiomyopathy.  The patient was put on Coreg.  No ACE inhibitor was 
started in order to 

not drop his blood pressure excessively.  This finding still requires 
evaluation for coronary 

disease, which should be done as an outpatient.  Because of this, the patient 
was advised to do

no strenuous exercise and decrease his workload at his employment over the next 
1 month or 

until seen by his PCP and referred to a Cardiologist for stress testing, blood 
pressure and 

cholesterol management.

6.  Tinnitus.  The patient had chronic complaints of tinnitus which were noted 
in discussion.

7.  Noncompliance.  The importance of treatment of heart and stroke risk 
factors was reviewed 

with the patient and his wife at length at the bedside and he was sent home 
with instructional 

literature regarding cholesterol control, Plavix use, Carvedilol use, 
hypertension control, 

cardiac risk factors, stroke education and cholesterol education.  He was 
advised to begin 

speech therapy, be seen in the stroke clinic, have an appointment with a 
cardiologist and to 

take his medications.



LABS AND IMAGING: reviewed and summarized above.

 

ALLERGIES:  NONE.

 

MEDICATIONS AT THE TIME OF DISCHARGE

1.  Aspirin 81 mg p.o. daily.

2.  Plavix 75 mg p.o. daily.

3.  Lipitor 80 mg p.o. at bedtime.

4.  Coreg 3.125 p.o. b.i.d.

 

CONDITION AT THE TIME OF DISCHARGE:  Stable.

 

PHYSICAL EXAMINATION:  At the time of discharge:

VITAL SIGNS:  Blood pressure 163/86, heart rate 70 in sinus rhythm, afebrile, 
room air 

saturation 99%.

HEENT:  Unremarkable except for forced somewhat slow speech.

NECK:  No carotid bruits.  No JVD.  No thyromegaly or lymphadenopathy.

CHEST:  Clear.

HEART:  Sounds normal.

ABDOMEN:  Unremarkable.

EXTREMITIES:  Unremarkable.

NEUROLOGIC:  Motor and sensory exam grossly normal, balance normal, possible 
facial muscle 

asymmetry and slow forced speech.

 

CODE STATUS:  FULL CODE.

 

FOLLOWUP:  Speech Therapy clinic, PCP, Neurology, Cardiology.

 

Total time required to complete this entire discharge: 60 minutes.

 

 

DD: 02/14/2018 01:04

TD: 02/14/2018 05:25

Job #: [id number]

MTDD

## 2022-05-04 ENCOUNTER — HOSPITAL ENCOUNTER (OUTPATIENT)
Dept: HOSPITAL 76 - LAB.S | Age: 67
Discharge: HOME | End: 2022-05-04
Attending: INTERNAL MEDICINE
Payer: COMMERCIAL

## 2022-05-04 DIAGNOSIS — I10: Primary | ICD-10-CM

## 2022-05-04 LAB
ALBUMIN DIAFP-MCNC: 4.2 G/DL (ref 3.2–5.5)
ALBUMIN/GLOB SERPL: 1.8 {RATIO} (ref 1–2.2)
ALP SERPL-CCNC: 47 IU/L (ref 42–121)
ALT SERPL W P-5'-P-CCNC: 25 IU/L (ref 10–60)
ANION GAP SERPL CALCULATED.4IONS-SCNC: 8 MMOL/L (ref 6–13)
AST SERPL W P-5'-P-CCNC: 22 IU/L (ref 10–42)
BASOPHILS NFR BLD AUTO: 0.1 10^3/UL (ref 0–0.1)
BASOPHILS NFR BLD AUTO: 0.9 %
BILIRUB BLD-MCNC: 0.8 MG/DL (ref 0.2–1)
BUN SERPL-MCNC: 13 MG/DL (ref 6–20)
CALCIUM UR-MCNC: 9.6 MG/DL (ref 8.5–10.3)
CHLORIDE SERPL-SCNC: 102 MMOL/L (ref 101–111)
CO2 SERPL-SCNC: 30 MMOL/L (ref 21–32)
CREAT SERPLBLD-SCNC: 0.9 MG/DL (ref 0.6–1.2)
EOSINOPHIL # BLD AUTO: 0.1 10^3/UL (ref 0–0.7)
EOSINOPHIL NFR BLD AUTO: 2.3 %
ERYTHROCYTE [DISTWIDTH] IN BLOOD BY AUTOMATED COUNT: 13.4 % (ref 12–15)
GFRSERPLBLD MDRD-ARVRAT: 85 ML/MIN/{1.73_M2} (ref 89–?)
GLOBULIN SER-MCNC: 2.3 G/DL (ref 2.1–4.2)
GLUCOSE SERPL-MCNC: 104 MG/DL (ref 70–100)
HCT VFR BLD AUTO: 44.1 % (ref 42–52)
HGB UR QL STRIP: 14.7 G/DL (ref 14–18)
LYMPHOCYTES # SPEC AUTO: 2.1 10^3/UL (ref 1.5–3.5)
LYMPHOCYTES NFR BLD AUTO: 36.1 %
MCH RBC QN AUTO: 30.3 PG (ref 27–31)
MCHC RBC AUTO-ENTMCNC: 33.3 G/DL (ref 32–36)
MCV RBC AUTO: 90.9 FL (ref 80–94)
MONOCYTES # BLD AUTO: 0.6 10^3/UL (ref 0–1)
MONOCYTES NFR BLD AUTO: 10.8 %
NEUTROPHILS # BLD AUTO: 2.9 10^3/UL (ref 1.5–6.6)
NEUTROPHILS # SNV AUTO: 5.7 X10^3/UL (ref 4.8–10.8)
NEUTROPHILS NFR BLD AUTO: 49.6 %
NRBC # BLD AUTO: 0 /100WBC
NRBC # BLD AUTO: 0 X10^3/UL
PDW BLD AUTO: 10.6 FL (ref 7.4–11.4)
PLATELET # BLD: 324 10^3/UL (ref 130–450)
POTASSIUM SERPL-SCNC: 4 MMOL/L (ref 3.5–5)
PROT SPEC-MCNC: 6.5 G/DL (ref 6.7–8.2)
RBC MAR: 4.85 10^6/UL (ref 4.7–6.1)
SODIUM SERPLBLD-SCNC: 140 MMOL/L (ref 135–145)
T4 FREE SERPL-MCNC: 0.73 NG/DL (ref 0.58–1.64)
TSH SERPL-ACNC: 1.23 UIU/ML (ref 0.34–5.6)

## 2022-05-04 PROCEDURE — 85025 COMPLETE CBC W/AUTO DIFF WBC: CPT

## 2022-05-04 PROCEDURE — 80053 COMPREHEN METABOLIC PANEL: CPT

## 2022-05-04 PROCEDURE — 36415 COLL VENOUS BLD VENIPUNCTURE: CPT

## 2022-05-04 PROCEDURE — 84439 ASSAY OF FREE THYROXINE: CPT

## 2022-05-04 PROCEDURE — 84402 ASSAY OF FREE TESTOSTERONE: CPT

## 2022-05-04 PROCEDURE — 84443 ASSAY THYROID STIM HORMONE: CPT
